# Patient Record
Sex: FEMALE | Race: BLACK OR AFRICAN AMERICAN
[De-identification: names, ages, dates, MRNs, and addresses within clinical notes are randomized per-mention and may not be internally consistent; named-entity substitution may affect disease eponyms.]

---

## 2021-04-20 ENCOUNTER — HOSPITAL ENCOUNTER (OUTPATIENT)
Dept: HOSPITAL 61 - KCIC MRI | Age: 41
End: 2021-04-20
Payer: OTHER GOVERNMENT

## 2021-04-20 DIAGNOSIS — M17.0: Primary | ICD-10-CM

## 2021-04-20 DIAGNOSIS — M23.92: ICD-10-CM

## 2021-04-20 PROCEDURE — 73721 MRI JNT OF LWR EXTRE W/O DYE: CPT

## 2021-04-20 NOTE — [facility]
EXAMINATION: MRI LEFT KNEE WITHOUT IV CONTRAST



CLINICAL HISTORY: Left knee pain concerning for medial femoral condyle osteochondral lesion. Pain whe
n running for 8 months. NKI. Pain is both anterior and posterior.



TECHNIQUE: Multiplanar multisequential images obtained through the knee without intravenous contrast.




COMPARISON: None 





FINDINGS:  



MENISCI: 

Medial Meniscus: Intact.



Lateral Meniscus: Intact.



LIGAMENTS: 

ACL: Intact



PCL: Intact



MCL: Intact



LCL Complex: Intact



CARTILAGE:

Medial Femoral Condyle: Normal



Medial Tibial Plateau: Normal



Lateral Femoral Condyle: Normal



Lateral Tibial Plateau: Normal



Patella: Normal



Trochlea: Normal



TENDONS: Distal quadriceps and patellar tendons intact. Popliteus tendon intact.



BONES AND MARROW: No evidence of acute fracture or suspicious marrow replacing process.



MUSCLES: Muscle bulk and signal intensity within normal limits.



JOINT FLUID AND SYNOVIUM: No joint effusion. No synovitis. No Baker's cyst.





IMPRESSION:



Unremarkable exam left knee.



No meniscus tear or acute ligamentous injury. No osteochondral lesion.



Electronically signed by: Aaron Mckeon DO (4/20/2021 10:54 AM) [id number]